# Patient Record
Sex: MALE | Race: WHITE | NOT HISPANIC OR LATINO
[De-identification: names, ages, dates, MRNs, and addresses within clinical notes are randomized per-mention and may not be internally consistent; named-entity substitution may affect disease eponyms.]

---

## 2018-08-29 PROBLEM — Z00.00 ENCOUNTER FOR PREVENTIVE HEALTH EXAMINATION: Status: ACTIVE | Noted: 2018-08-29

## 2018-08-31 ENCOUNTER — APPOINTMENT (OUTPATIENT)
Dept: CARDIOLOGY | Facility: CLINIC | Age: 23
End: 2018-08-31
Payer: COMMERCIAL

## 2018-08-31 VITALS — SYSTOLIC BLOOD PRESSURE: 110 MMHG | DIASTOLIC BLOOD PRESSURE: 70 MMHG

## 2018-08-31 VITALS — OXYGEN SATURATION: 99 % | WEIGHT: 150 LBS | BODY MASS INDEX: 19.25 KG/M2 | HEART RATE: 77 BPM | HEIGHT: 74 IN

## 2018-08-31 DIAGNOSIS — F19.90 OTHER PSYCHOACTIVE SUBSTANCE USE, UNSPECIFIED, UNCOMPLICATED: ICD-10-CM

## 2018-08-31 DIAGNOSIS — R05 COUGH: ICD-10-CM

## 2018-08-31 DIAGNOSIS — Z78.9 OTHER SPECIFIED HEALTH STATUS: ICD-10-CM

## 2018-08-31 DIAGNOSIS — F17.200 NICOTINE DEPENDENCE, UNSPECIFIED, UNCOMPLICATED: ICD-10-CM

## 2018-08-31 DIAGNOSIS — Z02.82 ENCOUNTER FOR ADOPTION SERVICES: ICD-10-CM

## 2018-08-31 PROCEDURE — 99204 OFFICE O/P NEW MOD 45 MIN: CPT

## 2018-08-31 PROCEDURE — 99407 BEHAV CHNG SMOKING > 10 MIN: CPT | Mod: 25

## 2018-08-31 PROCEDURE — 93229 REMOTE 30 DAY ECG TECH SUPP: CPT

## 2018-08-31 RX ORDER — CALCIUM CARBONATE 500 MG/1
500 TABLET, CHEWABLE ORAL AS DIRECTED
Refills: 0 | Status: ACTIVE | COMMUNITY
Start: 2018-08-31

## 2018-09-05 PROBLEM — R05 COUGH: Status: ACTIVE | Noted: 2018-09-05

## 2018-09-19 ENCOUNTER — APPOINTMENT (OUTPATIENT)
Dept: CARDIOLOGY | Facility: CLINIC | Age: 23
End: 2018-09-19
Payer: COMMERCIAL

## 2018-09-19 PROCEDURE — 93306 TTE W/DOPPLER COMPLETE: CPT

## 2018-10-08 ENCOUNTER — APPOINTMENT (OUTPATIENT)
Dept: CARDIOLOGY | Facility: CLINIC | Age: 23
End: 2018-10-08
Payer: COMMERCIAL

## 2018-10-08 VITALS
OXYGEN SATURATION: 98 % | WEIGHT: 154 LBS | HEIGHT: 62 IN | RESPIRATION RATE: 16 BRPM | BODY MASS INDEX: 28.34 KG/M2 | HEART RATE: 92 BPM

## 2018-10-08 VITALS — SYSTOLIC BLOOD PRESSURE: 120 MMHG | DIASTOLIC BLOOD PRESSURE: 70 MMHG

## 2018-10-08 PROCEDURE — 99213 OFFICE O/P EST LOW 20 MIN: CPT

## 2018-10-31 ENCOUNTER — MOBILE ON CALL (OUTPATIENT)
Age: 23
End: 2018-10-31

## 2018-11-05 ENCOUNTER — NON-APPOINTMENT (OUTPATIENT)
Age: 23
End: 2018-11-05

## 2018-11-05 ENCOUNTER — APPOINTMENT (OUTPATIENT)
Dept: CARDIOLOGY | Facility: CLINIC | Age: 23
End: 2018-11-05
Payer: COMMERCIAL

## 2018-11-05 VITALS — OXYGEN SATURATION: 99 % | HEART RATE: 88 BPM

## 2018-11-05 VITALS — DIASTOLIC BLOOD PRESSURE: 70 MMHG | SYSTOLIC BLOOD PRESSURE: 130 MMHG

## 2018-11-05 PROCEDURE — 99214 OFFICE O/P EST MOD 30 MIN: CPT

## 2018-11-05 PROCEDURE — 93000 ELECTROCARDIOGRAM COMPLETE: CPT

## 2018-11-05 RX ORDER — METOPROLOL SUCCINATE 25 MG/1
25 TABLET, EXTENDED RELEASE ORAL DAILY
Qty: 90 | Refills: 1 | Status: DISCONTINUED | COMMUNITY
Start: 2018-10-31 | End: 2018-11-05

## 2018-12-12 ENCOUNTER — EMERGENCY (EMERGENCY)
Facility: HOSPITAL | Age: 23
LOS: 1 days | Discharge: HOME | End: 2018-12-12
Attending: EMERGENCY MEDICINE
Payer: COMMERCIAL

## 2018-12-12 VITALS
DIASTOLIC BLOOD PRESSURE: 90 MMHG | OXYGEN SATURATION: 98 % | SYSTOLIC BLOOD PRESSURE: 152 MMHG | HEART RATE: 120 BPM | TEMPERATURE: 98 F | RESPIRATION RATE: 20 BRPM

## 2018-12-12 LAB
ALBUMIN SERPL ELPH-MCNC: 4.8 G/DL — SIGNIFICANT CHANGE UP (ref 3.3–5)
ALP SERPL-CCNC: 59 U/L — SIGNIFICANT CHANGE UP (ref 40–120)
ALT FLD-CCNC: 20 U/L — SIGNIFICANT CHANGE UP (ref 10–45)
ANION GAP SERPL CALC-SCNC: 16 MMOL/L — SIGNIFICANT CHANGE UP (ref 5–17)
APTT BLD: 27.8 SEC — SIGNIFICANT CHANGE UP (ref 27.5–36.3)
AST SERPL-CCNC: 12 U/L — SIGNIFICANT CHANGE UP (ref 10–40)
BASOPHILS # BLD AUTO: 0.1 K/UL — SIGNIFICANT CHANGE UP (ref 0–0.2)
BASOPHILS NFR BLD AUTO: 0.9 % — SIGNIFICANT CHANGE UP (ref 0–2)
BILIRUB SERPL-MCNC: 0.6 MG/DL — SIGNIFICANT CHANGE UP (ref 0.2–1.2)
BUN SERPL-MCNC: 15 MG/DL — SIGNIFICANT CHANGE UP (ref 7–23)
CALCIUM SERPL-MCNC: 9.8 MG/DL — SIGNIFICANT CHANGE UP (ref 8.4–10.5)
CHLORIDE SERPL-SCNC: 105 MMOL/L — SIGNIFICANT CHANGE UP (ref 96–108)
CO2 SERPL-SCNC: 21 MMOL/L — LOW (ref 22–31)
CREAT SERPL-MCNC: 1.05 MG/DL — SIGNIFICANT CHANGE UP (ref 0.5–1.3)
EOSINOPHIL # BLD AUTO: 0.2 K/UL — SIGNIFICANT CHANGE UP (ref 0–0.5)
EOSINOPHIL NFR BLD AUTO: 2.7 % — SIGNIFICANT CHANGE UP (ref 0–6)
GLUCOSE SERPL-MCNC: 104 MG/DL — HIGH (ref 70–99)
HCT VFR BLD CALC: 47.2 % — SIGNIFICANT CHANGE UP (ref 39–50)
HGB BLD-MCNC: 16.6 G/DL — SIGNIFICANT CHANGE UP (ref 13–17)
INR BLD: 1.05 RATIO — SIGNIFICANT CHANGE UP (ref 0.88–1.16)
LYMPHOCYTES # BLD AUTO: 3 K/UL — SIGNIFICANT CHANGE UP (ref 1–3.3)
LYMPHOCYTES # BLD AUTO: 35.6 % — SIGNIFICANT CHANGE UP (ref 13–44)
MCHC RBC-ENTMCNC: 30 PG — SIGNIFICANT CHANGE UP (ref 27–34)
MCHC RBC-ENTMCNC: 35.3 GM/DL — SIGNIFICANT CHANGE UP (ref 32–36)
MCV RBC AUTO: 85.1 FL — SIGNIFICANT CHANGE UP (ref 80–100)
MONOCYTES # BLD AUTO: 0.8 K/UL — SIGNIFICANT CHANGE UP (ref 0–0.9)
MONOCYTES NFR BLD AUTO: 9 % — SIGNIFICANT CHANGE UP (ref 2–14)
NEUTROPHILS # BLD AUTO: 4.4 K/UL — SIGNIFICANT CHANGE UP (ref 1.8–7.4)
NEUTROPHILS NFR BLD AUTO: 51.8 % — SIGNIFICANT CHANGE UP (ref 43–77)
PLATELET # BLD AUTO: 225 K/UL — SIGNIFICANT CHANGE UP (ref 150–400)
POTASSIUM SERPL-MCNC: 3.6 MMOL/L — SIGNIFICANT CHANGE UP (ref 3.5–5.3)
POTASSIUM SERPL-SCNC: 3.6 MMOL/L — SIGNIFICANT CHANGE UP (ref 3.5–5.3)
PROT SERPL-MCNC: 7.2 G/DL — SIGNIFICANT CHANGE UP (ref 6–8.3)
PROTHROM AB SERPL-ACNC: 12.1 SEC — SIGNIFICANT CHANGE UP (ref 10–12.9)
RBC # BLD: 5.54 M/UL — SIGNIFICANT CHANGE UP (ref 4.2–5.8)
RBC # FLD: 11.2 % — SIGNIFICANT CHANGE UP (ref 10.3–14.5)
SODIUM SERPL-SCNC: 142 MMOL/L — SIGNIFICANT CHANGE UP (ref 135–145)
WBC # BLD: 8.5 K/UL — SIGNIFICANT CHANGE UP (ref 3.8–10.5)
WBC # FLD AUTO: 8.5 K/UL — SIGNIFICANT CHANGE UP (ref 3.8–10.5)

## 2018-12-12 PROCEDURE — 93010 ELECTROCARDIOGRAM REPORT: CPT

## 2018-12-12 PROCEDURE — 99284 EMERGENCY DEPT VISIT MOD MDM: CPT | Mod: 25

## 2018-12-12 PROCEDURE — 70450 CT HEAD/BRAIN W/O DYE: CPT | Mod: 26

## 2018-12-12 RX ORDER — ONDANSETRON 8 MG/1
4 TABLET, FILM COATED ORAL ONCE
Qty: 0 | Refills: 0 | Status: COMPLETED | OUTPATIENT
Start: 2018-12-12 | End: 2018-12-12

## 2018-12-12 RX ORDER — FAMOTIDINE 10 MG/ML
20 INJECTION INTRAVENOUS ONCE
Qty: 0 | Refills: 0 | Status: COMPLETED | OUTPATIENT
Start: 2018-12-12 | End: 2018-12-12

## 2018-12-12 RX ADMIN — ONDANSETRON 4 MILLIGRAM(S): 8 TABLET, FILM COATED ORAL at 20:27

## 2018-12-12 RX ADMIN — FAMOTIDINE 20 MILLIGRAM(S): 10 INJECTION INTRAVENOUS at 20:43

## 2018-12-12 NOTE — ED ADULT NURSE NOTE - NSIMPLEMENTINTERV_GEN_ALL_ED
Implemented All Fall Risk Interventions:  [place] to call system. Call bell, personal items and telephone within reach. Instruct patient to call for assistance. Room bathroom lighting operational. Non-slip footwear when patient is off stretcher. Physically safe environment: no spills, clutter or unnecessary equipment. Stretcher in lowest position, wheels locked, appropriate side rails in place. Provide visual cue, wrist band, yellow gown, etc. Monitor gait and stability. Monitor for mental status changes and reorient to person, place, and time. Review medications for side effects contributing to fall risk. Reinforce activity limits and safety measures with patient and family.

## 2018-12-12 NOTE — ED PROVIDER NOTE - PROGRESS NOTE DETAILS
CT head negative. Added CTA head and neck to rule out dissection. Neuro consulted, paged again at 1250am. Pt feels better. Ambulating with assistance. CTA negative. Pt states he feels much better. Ambulating without assistance. Tolerated PO. Neuro assessed patient, recommending concussion clinic follow up. Patient informed of ED visit findings, understands plan.  Patient provided with written and further verbal instructions not included in discharge paperwork.  Patient instructed to follow up with their primary care physician in 2-3 days and return for new, worsened, or persistent symptoms.

## 2018-12-12 NOTE — ED ADULT NURSE NOTE - NURSING NEURO ORIENTATION
oriented to person, place and time
1 person assist/nonverbal cues (demo/gestures)/set-up required/verbal cues

## 2018-12-12 NOTE — ED PROVIDER NOTE - PHYSICAL EXAMINATION
Neuro: Awake, alert and fully oriented x 4. Slow speech.   Cranial nerves: Extraocular movements full. Pupils equal, round, react to light. No nystagmus noted. Fifth nerve function is normal. There is no facial asymmetry noted. CN XII intact.   Motor exam: +diffuse tremors. good tone and bulk throughout. No pronator drift. Muscle strength is 5/5 throughout. Sensory examination is intact.

## 2018-12-12 NOTE — ED PROVIDER NOTE - NSFOLLOWUPINSTRUCTIONS_ED_ALL_ED_FT
Please follow up with your Primary MD in 24-48 hr.  Seek immediate medical care for any new/worsening signs or symptoms.  Follow up with [facility], Dr. Caballero, by calling [phone]

## 2018-12-12 NOTE — ED PROVIDER NOTE - OBJECTIVE STATEMENT
24yo male pmh arrhytmia on metoprolol p/w vomiting, photophobia, AMS, unstable gait after hitting his head on a cabinet at 530pm. Pt was playing with dog and turned his head into a cabinet. C/o severe headache, 2 episodes of vomiting, photophobia. Mother states pt had progressive worsening of gait and progressive altered mental status while in the car to the ED. Denies chest pain, dyspnea, abdominal pain, LOC, diarrhea, fevers. Denies a/c use or NSAID use. No other recent trauma. Social EtOH and marijuana use, none today. 22yo male pmh arrythmia on metoprolol p/w vomiting, photophobia, AMS, unstable gait after hitting his head on a cabinet at 530pm. Pt was playing with dog and turned his head into a cabinet. C/o severe headache, 2 episodes of vomiting, photophobia. Mother states pt had progressive worsening of gait and progressive altered mental status while in the car to the ED. Denies chest pain, dyspnea, abdominal pain, LOC, diarrhea, fevers. Denies a/c use or NSAID use. No other recent trauma. Social EtOH and marijuana use, none today.

## 2018-12-12 NOTE — ED PROVIDER NOTE - MEDICAL DECISION MAKING DETAILS
23M pmh arrhythmia on metoprolol p/w AMS, unstable gait, vomiting, photophobia after hitting head with mild mechanism at 530pm. Pt does not know month, difficulty expressing himself. CT head, labs, pepcid, zofran. If CT negative, will discuss with neuro regarding MRI. 23M pmh arrhythmia on metoprolol p/w AMS, unstable gait, vomiting, photophobia after hitting head with mild mechanism at 530pm. Pt does not know month, difficulty expressing himself. CT head, labs, pepcid, zofran. If CT negative, will discuss with neuro regarding MRI.  ATTG: Dr. Dietrich

## 2018-12-12 NOTE — ED ADULT NURSE NOTE - OBJECTIVE STATEMENT
24 y/o M presents to the ED c/o head injury.  Pt mom at bedside states the patient was playing with his dog around 530 PM and turned around fast and slammed his head into the dresser.  Mom states the incident was witnessed by daughter. Son states he remembers the incident.  Pt states he immediately had a headache and also reports vomiting x2 and feeling "tired".  Mom states the patient was very altered at home and states he was having unsteady gait.  In ED pt is A&Ox4, moves all extremities with 5/5 strength, but patient speaking slowly and mom states this is not how he normally speaks.  Mom states it seems as he "has to think of every word he is going to say before he says it".  Pt brought in CCD, evaluated by MD Bhatti and taken directly to CT on monitor.  Pt also is taking metoprolol for abnormal heart rate.  No blood thinners.

## 2018-12-13 VITALS
DIASTOLIC BLOOD PRESSURE: 89 MMHG | SYSTOLIC BLOOD PRESSURE: 135 MMHG | OXYGEN SATURATION: 98 % | TEMPERATURE: 98 F | HEART RATE: 81 BPM | RESPIRATION RATE: 18 BRPM

## 2018-12-13 PROCEDURE — 85730 THROMBOPLASTIN TIME PARTIAL: CPT

## 2018-12-13 PROCEDURE — 85610 PROTHROMBIN TIME: CPT

## 2018-12-13 PROCEDURE — 70496 CT ANGIOGRAPHY HEAD: CPT | Mod: 26

## 2018-12-13 PROCEDURE — 70498 CT ANGIOGRAPHY NECK: CPT

## 2018-12-13 PROCEDURE — 80053 COMPREHEN METABOLIC PANEL: CPT

## 2018-12-13 PROCEDURE — 70496 CT ANGIOGRAPHY HEAD: CPT

## 2018-12-13 PROCEDURE — 70498 CT ANGIOGRAPHY NECK: CPT | Mod: 26

## 2018-12-13 PROCEDURE — 80307 DRUG TEST PRSMV CHEM ANLYZR: CPT

## 2018-12-13 PROCEDURE — 93005 ELECTROCARDIOGRAM TRACING: CPT

## 2018-12-13 PROCEDURE — 99284 EMERGENCY DEPT VISIT MOD MDM: CPT | Mod: 25

## 2018-12-13 PROCEDURE — 96374 THER/PROPH/DIAG INJ IV PUSH: CPT | Mod: XU

## 2018-12-13 PROCEDURE — 70450 CT HEAD/BRAIN W/O DYE: CPT

## 2018-12-13 PROCEDURE — 85027 COMPLETE CBC AUTOMATED: CPT

## 2018-12-13 PROCEDURE — 96375 TX/PRO/DX INJ NEW DRUG ADDON: CPT | Mod: XU

## 2018-12-13 NOTE — CONSULT NOTE ADULT - PROBLEM SELECTOR RECOMMENDATION 9
likely Concussion grade 1 (no LOC, short lasting amnesia)   Plan:   -no further inpatient neurologic workup   -symptomatic care for pain   -monitor for worsening signs or symptoms in next 24 hours   -follow up outpatient neurology, Dr. David Marley, [phone] for long term management.

## 2018-12-13 NOTE — CONSULT NOTE ADULT - SUBJECTIVE AND OBJECTIVE BOX
Neurology Consult    Name: CLEVE SAWYER    HPI: 24 yo man who presents to [facility] s/p closed head injury (CTH unremarkable) after accidently hitting his head against a cabinet while chasing his dog around the house. ROS revealed associated headache, vomiting x 2, photophobia, trouble walking, and confusion. Otherwise unremarkable for acute vision changes, LOC, chest pain, SOB, abdominal pain, loss of bowel/bladder. Pertinent hx includes arrhythmia (unspecified). Neurology consulted for further recommendations     PMH/PSH:   Arrhythmia (unspecified)     MEDICATIONS  (STANDING):    MEDICATIONS  (PRN):    Allergies  No Known Allergies    Objective:   Vital Signs Last 24 Hrs  T(C): 36.7 (12 Dec 2018 20:15), Max: 36.7 (12 Dec 2018 20:15)  T(F): 98 (12 Dec 2018 20:15), Max: 98 (12 Dec 2018 20:15)  HR: 97 (12 Dec 2018 20:30) (97 - 120)  BP: 139/79 (12 Dec 2018 20:30) (139/79 - 152/90)  RR: 20 (12 Dec 2018 20:30) (20 - 20)  SpO2: 98% (12 Dec 2018 20:30) (98% - 98%)    General Exam:   General appearance: No acute distress                   Neurological Exam:  Mental Status: AAOx3, fluent speech, follows commands    Cranial Nerves: EOMI, PERRL, V1-V3 intact, facial symmetry intact, no dysarthria, tongue midline, VFF    Motor: 5/5 throughout. No drift x4    Sensation: Intact to LT throughout    Coordination: FTN intact b/l    Reflexes: 1+ bilateral biceps, brachioradialis, patellar and ankle    Gait: normal and stable.      Labs:    12-12    142  |  105  |  15  ----------------------------<  104<H>  3.6   |  21<L>  |  1.05    Ca    9.8      12 Dec 2018 20:04    TPro  7.2  /  Alb  4.8  /  TBili  0.6  /  DBili  x   /  AST  12  /  ALT  20  /  AlkPhos  59  12-12    LIVER FUNCTIONS - ( 12 Dec 2018 20:04 )  Alb: 4.8 g/dL / Pro: 7.2 g/dL / ALK PHOS: 59 U/L / ALT: 20 U/L / AST: 12 U/L / GGT: x           CBC Full  -  ( 12 Dec 2018 20:04 )  WBC Count : 8.5 K/uL  Hemoglobin : 16.6 g/dL  Hematocrit : 47.2 %  Platelet Count - Automated : 225 K/uL  Mean Cell Volume : 85.1 fl  Mean Cell Hemoglobin : 30.0 pg  Mean Cell Hemoglobin Concentration : 35.3 gm/dL  Auto Neutrophil # : 4.4 K/uL  Auto Lymphocyte # : 3.0 K/uL  Auto Monocyte # : 0.8 K/uL  Auto Eosinophil # : 0.2 K/uL  Auto Basophil # : 0.1 K/uL  Auto Neutrophil % : 51.8 %  Auto Lymphocyte % : 35.6 %  Auto Monocyte % : 9.0 %  Auto Eosinophil % : 2.7 %  Auto Basophil % : 0.9 %    Radiology  < from: CT Angio Neck w/ IV Cont (12.13.18 @ 00:07) >  IMPRESSION:   CT angiography neck: Patent carotid and vertebral arteries.  No evidence   of vascular dissection.    CT angiography brain: No major vessel occlusion or proximal stenosis.  No   evidence of intracranial hemorrhage, mass, or abnormal enhancement.    < end of copied text >    < from: CT Head No Cont (12.12.18 @ 20:08) >  FINDINGS:    There is no acute intracranial hemorrhage or mass effect. The ventricles   and sulci are normal in size.    The calvarium is intact.     The visualized portions of the paranasal sinuses and mastoid air cells   are clear.    IMPRESSION:   No acute intracranial hemorrhage or mass effect.     < end of copied text > Neurology Consult    Name: CLEVE SAWYER    HPI: 24 yo man who presents to [facility] s/p closed head injury (CTH unremarkable) after accidently hitting his head against a cabinet while chasing his dog around the house. ROS revealed associated headache, vomiting x 2, photophobia, trouble walking, and confusion. Otherwise unremarkable for acute vision changes, LOC, chest pain, SOB, abdominal pain, loss of bowel/bladder. Pertinent hx includes arrhythmia (unspecified). Neurology consulted for further recommendations     PMH/PSH:   Arrhythmia (unspecified)     MEDICATIONS  (STANDING):    MEDICATIONS  (PRN):    Allergies  No Known Allergies    Objective:   Vital Signs Last 24 Hrs  T(C): 36.7 (12 Dec 2018 20:15), Max: 36.7 (12 Dec 2018 20:15)  T(F): 98 (12 Dec 2018 20:15), Max: 98 (12 Dec 2018 20:15)  HR: 97 (12 Dec 2018 20:30) (97 - 120)  BP: 139/79 (12 Dec 2018 20:30) (139/79 - 152/90)  RR: 20 (12 Dec 2018 20:30) (20 - 20)  SpO2: 98% (12 Dec 2018 20:30) (98% - 98%)    General Exam:   General appearance: No acute distress                   Neurological Exam:  Mental Status: AAOx3 (person, place, time), fluent speech, names objects, repetition intact, 3 words (apple, table, misty) recall intact, follows commands    Cranial Nerves: EOMI no nystagmus no diplopia, PERRL, V1-V3 intact, facial symmetry intact, no dysarthria, tongue midline, VFF    Motor: 5/5 throughout. No drift x4    Sensation: Intact to LT throughout    Coordination: FTN intact b/l    Gait: normal and stable.  tandem gait normal.     Labs:    12-12    142  |  105  |  15  ----------------------------<  104<H>  3.6   |  21<L>  |  1.05    Ca    9.8      12 Dec 2018 20:04    TPro  7.2  /  Alb  4.8  /  TBili  0.6  /  DBili  x   /  AST  12  /  ALT  20  /  AlkPhos  59  12-12    LIVER FUNCTIONS - ( 12 Dec 2018 20:04 )  Alb: 4.8 g/dL / Pro: 7.2 g/dL / ALK PHOS: 59 U/L / ALT: 20 U/L / AST: 12 U/L / GGT: x           CBC Full  -  ( 12 Dec 2018 20:04 )  WBC Count : 8.5 K/uL  Hemoglobin : 16.6 g/dL  Hematocrit : 47.2 %  Platelet Count - Automated : 225 K/uL  Mean Cell Volume : 85.1 fl  Mean Cell Hemoglobin : 30.0 pg  Mean Cell Hemoglobin Concentration : 35.3 gm/dL  Auto Neutrophil # : 4.4 K/uL  Auto Lymphocyte # : 3.0 K/uL  Auto Monocyte # : 0.8 K/uL  Auto Eosinophil # : 0.2 K/uL  Auto Basophil # : 0.1 K/uL  Auto Neutrophil % : 51.8 %  Auto Lymphocyte % : 35.6 %  Auto Monocyte % : 9.0 %  Auto Eosinophil % : 2.7 %  Auto Basophil % : 0.9 %    Radiology  < from: CT Angio Neck w/ IV Cont (12.13.18 @ 00:07) >  IMPRESSION:   CT angiography neck: Patent carotid and vertebral arteries.  No evidence   of vascular dissection.    CT angiography brain: No major vessel occlusion or proximal stenosis.  No   evidence of intracranial hemorrhage, mass, or abnormal enhancement.    < end of copied text >    < from: CT Head No Cont (12.12.18 @ 20:08) >  FINDINGS:    There is no acute intracranial hemorrhage or mass effect. The ventricles   and sulci are normal in size.    The calvarium is intact.     The visualized portions of the paranasal sinuses and mastoid air cells   are clear.    IMPRESSION:   No acute intracranial hemorrhage or mass effect.     < end of copied text >

## 2018-12-13 NOTE — CONSULT NOTE ADULT - ATTENDING COMMENTS
I spoke with the resident about this patient but the patient was discharged before I could personally evaluate.

## 2018-12-13 NOTE — CONSULT NOTE ADULT - ASSESSMENT
24 yo man who presents to [facility] s/p closed head injury (CTH unremarkable) after accidently hitting his head against a cabinet while chasing his dog around the house. Patient now asymptomatic after extended stay in ED. Non-focal upon examination. Memory and recall intact. Red Flags such as bleeds ruled out.

## 2018-12-18 PROBLEM — I49.9 CARDIAC ARRHYTHMIA, UNSPECIFIED: Chronic | Status: ACTIVE | Noted: 2018-12-12

## 2018-12-20 ENCOUNTER — FORM ENCOUNTER (OUTPATIENT)
Age: 23
End: 2018-12-20

## 2018-12-20 ENCOUNTER — APPOINTMENT (OUTPATIENT)
Dept: NEUROSURGERY | Facility: CLINIC | Age: 23
End: 2018-12-20

## 2018-12-20 ENCOUNTER — OUTPATIENT (OUTPATIENT)
Dept: OUTPATIENT SERVICES | Facility: HOSPITAL | Age: 23
LOS: 1 days | Discharge: HOME | End: 2018-12-20

## 2018-12-26 DIAGNOSIS — S06.0X0A CONCUSSION WITHOUT LOSS OF CONSCIOUSNESS, INITIAL ENCOUNTER: ICD-10-CM

## 2019-01-07 ENCOUNTER — APPOINTMENT (OUTPATIENT)
Dept: CARDIOLOGY | Facility: CLINIC | Age: 24
End: 2019-01-07

## 2019-01-26 NOTE — ASSESSMENT
[FreeTextEntry1] : He has symptomatic atrial and ventricular arrhythmia.  It is not sustained. It is responding to low-dose metoprolol.

## 2019-01-26 NOTE — REASON FOR VISIT
[Follow-Up - Clinic] : a clinic follow-up of [Palpitations] : palpitations [FreeTextEntry1] : VPCs, SVT.

## 2019-01-26 NOTE — HISTORY OF PRESENT ILLNESS
[FreeTextEntry1] : Holter monitor showed VPCs including couplets and one episode of SVT.  He was started on metoprolol succinate 25 mg daily.  It is helping him but he still has some breakthrough arrhythmia particularly when he lies on in the night.\par \par During spring and fall season he gets typical symptoms of allergic rhinitis.  He is not on treatment now.  He had it  in the beginning of October.

## 2019-01-26 NOTE — DISCUSSION/SUMMARY
[FreeTextEntry1] : For better control of symptomatic arrhythmia I increased metoprolol succinate to 50 mg daily.I also encouraged him to take allergy medications during allergy season.

## 2019-01-28 ENCOUNTER — APPOINTMENT (OUTPATIENT)
Dept: CARDIOLOGY | Facility: CLINIC | Age: 24
End: 2019-01-28
Payer: COMMERCIAL

## 2019-01-28 VITALS — HEART RATE: 92 BPM | WEIGHT: 142 LBS | BODY MASS INDEX: 18.22 KG/M2 | OXYGEN SATURATION: 99 % | HEIGHT: 74 IN

## 2019-01-28 VITALS — SYSTOLIC BLOOD PRESSURE: 120 MMHG | DIASTOLIC BLOOD PRESSURE: 80 MMHG

## 2019-01-28 DIAGNOSIS — R07.9 CHEST PAIN, UNSPECIFIED: ICD-10-CM

## 2019-01-28 DIAGNOSIS — R00.2 PALPITATIONS: ICD-10-CM

## 2019-01-28 DIAGNOSIS — R06.02 SHORTNESS OF BREATH: ICD-10-CM

## 2019-01-28 DIAGNOSIS — K21.9 GASTRO-ESOPHAGEAL REFLUX DISEASE W/OUT ESOPHAGITIS: ICD-10-CM

## 2019-01-28 PROCEDURE — 99214 OFFICE O/P EST MOD 30 MIN: CPT

## 2019-01-28 RX ORDER — METOPROLOL SUCCINATE 50 MG/1
50 TABLET, EXTENDED RELEASE ORAL DAILY
Qty: 90 | Refills: 1 | Status: DISCONTINUED | COMMUNITY
Start: 2018-11-05 | End: 2019-01-28

## 2019-01-28 RX ORDER — METOPROLOL SUCCINATE 100 MG/1
100 TABLET, EXTENDED RELEASE ORAL DAILY
Qty: 90 | Refills: 1 | Status: ACTIVE | COMMUNITY
Start: 2019-01-28 | End: 1900-01-01

## 2019-01-28 NOTE — DISCUSSION/SUMMARY
[FreeTextEntry1] : For control of his symptoms I increased his metoprolol to 100 mg daily.  Follow-up in a month.   He was told to contact me if he had any adverse effects.

## 2019-01-28 NOTE — HISTORY OF PRESENT ILLNESS
[FreeTextEntry1] : He was doing well on metoprolol 50 mg daily until about 2 weeks ago when he started having palpitation associated shortness of breath.  It felt like skipping and at times racing for up to an hour at a time.  There was no dizziness.\par \par O over the last 4 or 5 days he said having sharp chest pain lasting a couple of minutes at a time.  It was occurring on and off for a total duration of about half.\par \par He contacted the office  2 days ago and at that time and told him to increase Metoprolol to 75 mg daily.  He feels slight improvement.  He was also getting mild shortness of breath on lying down

## 2019-01-28 NOTE — ASSESSMENT
[FreeTextEntry1] : He is hemodynamically stable.  Symptomatic arrhythmia has recurred.Holter monitor in the past showed sinus rhythm with VPCs and occasional ventricular couplet and a single run of SVT.

## 2019-03-04 ENCOUNTER — APPOINTMENT (OUTPATIENT)
Dept: CARDIOLOGY | Facility: CLINIC | Age: 24
End: 2019-03-04

## 2020-06-30 NOTE — PHYSICAL EXAM
[General Appearance - Well Developed] : well developed [Normal Appearance] : normal appearance [Well Groomed] : well groomed Detail Level: Zone [General Appearance - Well Nourished] : well nourished Samples Given: Amlactin [No Deformities] : no deformities [General Appearance - In No Acute Distress] : no acute distress [Normal Conjunctiva] : the conjunctiva exhibited no abnormalities [Eyelids - No Xanthelasma] : the eyelids demonstrated no xanthelasmas [Normal Oral Mucosa] : normal oral mucosa [No Oral Pallor] : no oral pallor [No Oral Cyanosis] : no oral cyanosis [Normal Jugular Venous A Waves Present] : normal jugular venous A waves present [Normal Jugular Venous V Waves Present] : normal jugular venous V waves present [No Jugular Venous Garcia A Waves] : no jugular venous garcia A waves [Respiration, Rhythm And Depth] : normal respiratory rhythm and effort [Exaggerated Use Of Accessory Muscles For Inspiration] : no accessory muscle use [Auscultation Breath Sounds / Voice Sounds] : lungs were clear to auscultation bilaterally [Heart Rate And Rhythm] : heart rate and rhythm were normal [Heart Sounds] : normal S1 and S2 [Murmurs] : no murmurs present [Abdomen Soft] : soft [Abdomen Tenderness] : non-tender [Abdomen Mass (___ Cm)] : no abdominal mass palpated [Abnormal Walk] : normal gait [Gait - Sufficient For Exercise Testing] : the gait was sufficient for exercise testing [Nail Clubbing] : no clubbing of the fingernails [Cyanosis, Localized] : no localized cyanosis [Petechial Hemorrhages (___cm)] : no petechial hemorrhages [Skin Color & Pigmentation] : normal skin color and pigmentation [] : no rash [No Venous Stasis] : no venous stasis [Skin Lesions] : no skin lesions [No Skin Ulcers] : no skin ulcer [No Xanthoma] : no  xanthoma was observed [Oriented To Time, Place, And Person] : oriented to person, place, and time [Affect] : the affect was normal [Mood] : the mood was normal [No Anxiety] : not feeling anxious

## 2022-05-23 ENCOUNTER — TRANSCRIPTION ENCOUNTER (OUTPATIENT)
Age: 27
End: 2022-05-23

## 2025-04-22 NOTE — ED PROVIDER NOTE - PROGRESS NOTE
Detail Level: Detailed Depth Of Biopsy: dermis Was A Bandage Applied: Yes Size Of Lesion In Cm: 0.2 X Size Of Lesion In Cm: 0 Biopsy Type: H and E Biopsy Method: Dermablade Anesthesia Type: 1% lidocaine with epinephrine Anesthesia Volume In Cc: 0.5 Hemostasis: Aluminum Chloride Wound Care: Vaseline Dressing: bandage Destruction After The Procedure: No Type Of Destruction Used: Curettage Curettage Text: The wound bed was treated with curettage after the biopsy was performed. Cryotherapy Text: The wound bed was treated with cryotherapy after the biopsy was performed. Electrodesiccation Text: The wound bed was treated with electrodesiccation after the biopsy was performed. Electrodesiccation And Curettage Text: The wound bed was treated with electrodesiccation and curettage after the biopsy was performed. Silver Nitrate Text: The wound bed was treated with silver nitrate after the biopsy was performed. Lab: [id number] Improved. Medical Necessity Information: It is in your best interest to select a reason for this procedure from the list below. All of these items fulfill various CMS LCD requirements except the new and changing color options. Consent: Written consent was obtained and risks were reviewed including but not limited to scarring, infection, bleeding, scabbing, incomplete removal, nerve damage and allergy to anesthesia. Post-Care Instructions: I reviewed with the patient in detail post-care instructions. \\n1.  If any bleeding occurs, apply firm pressure for 15 minutes.  If it persists after this time, call the office for instructions.\\n2. The day of your biopsy you may keep any band-aids or dressings in place.  The following morning these are to be removed and the area washed with soap and water.  Pat dry and apply Vaseline.  Avoid antibiotic ointments such as Polysporin, Bacitracin and Neosporin; they may cause a rash (allergy)\\n3. Cleanse the area twice a day in this manner until the skin has healed.  Dressings are advised, but are optional during this time.  Keep the site moist; do NOT allow the area to dry and form a scab.  Care for sutures (stitches) in the same way.\\n4. Return in ________________days for suture removal.  Please call [phone] for the pathology report in 7-10 days.  Depending on this report, additional visits or surgeries may be recommended.\\n5. If your results are benign you will be contacted as well by phone Notification Instructions: Patient will be notified of biopsy results. However, patient instructed to call the office if not contacted within 2 weeks. Billing Type: Third-Party Bill Information: Selecting Yes will display possible errors in your note based on the variables you have selected. This validation is only offered as a suggestion for you. PLEASE NOTE THAT THE VALIDATION TEXT WILL BE REMOVED WHEN YOU FINALIZE YOUR NOTE. IF YOU WANT TO FAX A PRELIMINARY NOTE YOU WILL NEED TO TOGGLE THIS TO 'NO' IF YOU DO NOT WANT IT IN YOUR FAXED NOTE.